# Patient Record
Sex: FEMALE | Race: WHITE | NOT HISPANIC OR LATINO | Employment: OTHER | URBAN - METROPOLITAN AREA
[De-identification: names, ages, dates, MRNs, and addresses within clinical notes are randomized per-mention and may not be internally consistent; named-entity substitution may affect disease eponyms.]

---

## 2023-08-21 ENCOUNTER — HOSPITAL ENCOUNTER (OUTPATIENT)
Dept: DATA CONVERSION | Facility: HOSPITAL | Age: 68
Discharge: HOME | End: 2023-08-21
Payer: MEDICARE

## 2023-08-21 DIAGNOSIS — Z00.00 ENCOUNTER FOR GENERAL ADULT MEDICAL EXAMINATION WITHOUT ABNORMAL FINDINGS: ICD-10-CM

## 2023-08-21 DIAGNOSIS — R82.90 UNSPECIFIED ABNORMAL FINDINGS IN URINE: ICD-10-CM

## 2023-08-21 DIAGNOSIS — E78.49 OTHER HYPERLIPIDEMIA: ICD-10-CM

## 2023-08-21 LAB
ALBUMIN SERPL-MCNC: 3.9 GM/DL (ref 3.5–5)
ALBUMIN/GLOB SERPL: 1.3 RATIO (ref 1.5–3)
ALP BLD-CCNC: 132 U/L (ref 35–125)
ALT SERPL-CCNC: 19 U/L (ref 5–40)
ANION GAP SERPL CALCULATED.3IONS-SCNC: 12 MMOL/L (ref 0–19)
APPEARANCE PLAS: ABNORMAL
AST SERPL-CCNC: 19 U/L (ref 5–40)
BACTERIA SPEC CULT: NORMAL
BILIRUB SERPL-MCNC: 0.7 MG/DL (ref 0.1–1.2)
BILIRUB UR QL STRIP.AUTO: NEGATIVE
BUN SERPL-MCNC: 17 MG/DL (ref 8–25)
BUN/CREAT SERPL: 17 RATIO (ref 8–21)
CALCIUM SERPL-MCNC: 9.4 MG/DL (ref 8.5–10.4)
CC # UR: NORMAL /UL
CHLORIDE SERPL-SCNC: 104 MMOL/L (ref 97–107)
CHOLEST SERPL-MCNC: 176 MG/DL (ref 133–200)
CHOLEST/HDLC SERPL: 4.2 RATIO
CLARITY UR: CLEAR
CO2 SERPL-SCNC: 24 MMOL/L (ref 24–31)
COLOR SPUN FLD: ABNORMAL
COLOR UR: YELLOW
CREAT SERPL-MCNC: 1 MG/DL (ref 0.4–1.6)
DEPRECATED RDW RBC AUTO: 45.9 FL (ref 37–54)
ERYTHROCYTE [DISTWIDTH] IN BLOOD BY AUTOMATED COUNT: 13.4 % (ref 11.7–15)
FASTING STATUS PATIENT QL REPORTED: ABNORMAL
GFR SERPL CREATININE-BSD FRML MDRD: 62 ML/MIN/1.73 M2
GLOBULIN SER-MCNC: 3 G/DL (ref 1.9–3.7)
GLUCOSE SERPL-MCNC: 94 MG/DL (ref 65–99)
GLUCOSE UR STRIP.AUTO-MCNC: NEGATIVE MG/DL
HCT VFR BLD AUTO: 43 % (ref 36–44)
HDLC SERPL-MCNC: 42 MG/DL
HGB BLD-MCNC: 13.7 GM/DL (ref 12–15)
HGB UR QL STRIP.AUTO: ABNORMAL /HPF (ref 0–3)
HGB UR QL: NEGATIVE
KETONES UR QL STRIP.AUTO: NEGATIVE
LDLC SERPL CALC-MCNC: 108 MG/DL (ref 65–130)
LEUKOCYTE ESTERASE UR QL STRIP.AUTO: ABNORMAL
MCH RBC QN AUTO: 29.5 PG (ref 26–34)
MCHC RBC AUTO-ENTMCNC: 31.9 % (ref 31–37)
MCV RBC AUTO: 92.5 FL (ref 80–100)
MICRO URNS: ABNORMAL
MICROSCOPIC (UA): ABNORMAL
NITRITE UR QL STRIP.AUTO: NEGATIVE
NRBC BLD-RTO: 0 /100 WBC
PH UR STRIP.AUTO: 5.5 [PH] (ref 4.6–8)
PLATELET # BLD AUTO: 277 K/UL (ref 150–450)
PMV BLD AUTO: 10.3 CU (ref 7–12.6)
POTASSIUM SERPL-SCNC: 4.3 MMOL/L (ref 3.4–5.1)
PROT SERPL-MCNC: 6.9 G/DL (ref 5.9–7.9)
PROT UR STRIP.AUTO-MCNC: NEGATIVE MG/DL
RBC # BLD AUTO: 4.65 M/UL (ref 4–4.9)
REPORT STATUS -LH SQ DATA CONVERSION: NORMAL
SERVICE CMNT-IMP: NORMAL
SODIUM SERPL-SCNC: 140 MMOL/L (ref 133–145)
SP GR UR STRIP.AUTO: 1.02 (ref 1–1.03)
SPECIMEN SOURCE: NORMAL
TRIGL SERPL-MCNC: 128 MG/DL (ref 40–150)
UNSPECIFIED CRY UR QL COMP ASSIST: ABNORMAL
URINE CULTURE: ABNORMAL
UROBILINOGEN UR QL STRIP.AUTO: NORMAL MG/DL (ref 0–1)
WBC # BLD AUTO: 7.3 K/UL (ref 4.5–11)
WBC #/AREA URNS AUTO: ABNORMAL /HPF (ref 0–3)

## 2023-08-23 ENCOUNTER — HOSPITAL ENCOUNTER (OUTPATIENT)
Dept: DATA CONVERSION | Facility: HOSPITAL | Age: 68
Discharge: HOME | End: 2023-08-23
Payer: MEDICARE

## 2023-08-23 DIAGNOSIS — Z12.31 ENCOUNTER FOR SCREENING MAMMOGRAM FOR MALIGNANT NEOPLASM OF BREAST: ICD-10-CM

## 2023-08-30 ENCOUNTER — HOSPITAL ENCOUNTER (OUTPATIENT)
Dept: DATA CONVERSION | Facility: HOSPITAL | Age: 68
Discharge: HOME | End: 2023-08-30
Payer: MEDICARE

## 2023-08-30 DIAGNOSIS — R74.8 ABNORMAL LEVELS OF OTHER SERUM ENZYMES: ICD-10-CM

## 2023-09-04 LAB
ALP BONE SERPL-CCNC: ABNORMAL U/L
ALP ISOS SERPL HS-CCNC: ABNORMAL U/L
ALP LIVER SERPL-CCNC: ABNORMAL U/L
ALP SERPL-CCNC: ABNORMAL U/L

## 2023-09-05 PROBLEM — I20.9 ANGINA PECTORIS (CMS-HCC): Status: ACTIVE | Noted: 2023-09-05

## 2023-09-05 PROBLEM — M70.62 TROCHANTERIC BURSITIS, LEFT HIP: Status: ACTIVE | Noted: 2023-09-05

## 2023-09-05 PROBLEM — F32.A DEPRESSION: Status: ACTIVE | Noted: 2023-09-05

## 2023-09-05 PROBLEM — M54.16 LUMBAR RADICULAR PAIN: Status: ACTIVE | Noted: 2023-09-05

## 2023-09-05 PROBLEM — F43.10 PTSD (POST-TRAUMATIC STRESS DISORDER): Status: ACTIVE | Noted: 2023-09-05

## 2023-09-05 PROBLEM — J44.9 CHRONIC OBSTRUCTIVE PULMONARY DISEASE (MULTI): Status: ACTIVE | Noted: 2023-09-05

## 2023-09-05 PROBLEM — M54.12 CERVICAL RADICULOPATHY: Status: ACTIVE | Noted: 2023-09-05

## 2023-09-05 PROBLEM — R93.1 ABNORMAL FINDINGS ON DIAGNOSTIC IMAGING OF HEART AND CORONARY CIRCULATION: Status: ACTIVE | Noted: 2023-09-05

## 2023-09-05 PROBLEM — E78.2 MIXED HYPERLIPIDEMIA: Status: ACTIVE | Noted: 2023-09-05

## 2023-09-05 PROBLEM — M35.3 POLYMYALGIA (MULTI): Status: ACTIVE | Noted: 2023-09-05

## 2023-09-05 PROBLEM — M70.61 TROCHANTERIC BURSITIS, RIGHT HIP: Status: ACTIVE | Noted: 2023-09-05

## 2023-09-05 PROBLEM — M75.101 ROTATOR CUFF SYNDROME OF RIGHT SHOULDER: Status: ACTIVE | Noted: 2023-09-05

## 2023-09-05 PROBLEM — J43.2 CENTRILOBULAR EMPHYSEMA (MULTI): Status: ACTIVE | Noted: 2023-09-05

## 2023-09-05 PROBLEM — I77.819 DILATATION OF AORTA (CMS-HCC): Status: ACTIVE | Noted: 2023-09-05

## 2023-09-05 PROBLEM — G89.4 CHRONIC PAIN SYNDROME: Status: ACTIVE | Noted: 2023-09-05

## 2023-09-05 PROBLEM — F43.9 STRESS: Status: ACTIVE | Noted: 2023-09-05

## 2023-09-05 PROBLEM — M47.814 THORACIC SPONDYLOSIS: Status: ACTIVE | Noted: 2023-09-05

## 2023-09-05 PROBLEM — M79.7 FIBROMYALGIA: Status: ACTIVE | Noted: 2023-09-05

## 2023-09-05 PROBLEM — M25.559 PAIN IN JOINT INVOLVING PELVIC REGION AND THIGH: Status: ACTIVE | Noted: 2023-09-05

## 2023-09-05 PROBLEM — I71.21 ASCENDING AORTIC ANEURYSM (CMS-HCC): Status: ACTIVE | Noted: 2023-09-05

## 2023-09-05 PROBLEM — G45.3 AMAUROSIS FUGAX OF RIGHT EYE: Status: ACTIVE | Noted: 2023-09-05

## 2023-09-05 PROBLEM — E55.9 VITAMIN D DEFICIENCY: Status: ACTIVE | Noted: 2023-09-05

## 2023-09-05 PROBLEM — R06.02 SHORTNESS OF BREATH: Status: ACTIVE | Noted: 2023-09-05

## 2023-09-05 PROBLEM — I71.20 THORACIC AORTIC ANEURYSM WITHOUT RUPTURE (CMS-HCC): Status: ACTIVE | Noted: 2023-09-05

## 2023-09-05 PROBLEM — I71.9 DESCENDING AORTIC ANEURYSM (CMS-HCC): Status: ACTIVE | Noted: 2023-09-05

## 2023-09-05 PROBLEM — I77.810 DILATED AORTIC ROOT (CMS-HCC): Status: ACTIVE | Noted: 2023-09-05

## 2023-09-05 PROBLEM — G89.29 OTHER CHRONIC PAIN: Status: ACTIVE | Noted: 2023-09-05

## 2023-09-05 PROBLEM — E83.52 HYPERCALCEMIA: Status: ACTIVE | Noted: 2023-09-05

## 2023-09-05 PROBLEM — F17.200 TOBACCO USE DISORDER: Status: ACTIVE | Noted: 2023-09-05

## 2023-09-05 RX ORDER — ASPIRIN 81 MG/1
1 TABLET ORAL DAILY
COMMUNITY

## 2023-09-05 RX ORDER — BUPROPION HYDROCHLORIDE 150 MG/1
1 TABLET, FILM COATED, EXTENDED RELEASE ORAL DAILY
COMMUNITY
Start: 2022-02-16 | End: 2023-10-23 | Stop reason: ALTCHOICE

## 2023-09-05 RX ORDER — PRAVASTATIN SODIUM 80 MG/1
1 TABLET ORAL DAILY
COMMUNITY

## 2023-09-05 RX ORDER — VARENICLINE TARTRATE 0.5 MG/1
1 TABLET, FILM COATED ORAL DAILY
COMMUNITY
Start: 2022-02-21 | End: 2023-10-23 | Stop reason: ALTCHOICE

## 2023-09-05 RX ORDER — ACETAMINOPHEN 500 MG
1 TABLET ORAL DAILY
COMMUNITY
End: 2023-10-23 | Stop reason: ALTCHOICE

## 2023-09-05 RX ORDER — OXYCODONE AND ACETAMINOPHEN 5; 325 MG/1; MG/1
1 TABLET ORAL EVERY 6 HOURS PRN
COMMUNITY
Start: 2022-12-27 | End: 2023-10-23 | Stop reason: ALTCHOICE

## 2023-09-05 RX ORDER — NADOLOL 20 MG/1
1 TABLET ORAL DAILY
COMMUNITY

## 2023-09-05 RX ORDER — EPINEPHRINE 0.22MG
1 AEROSOL WITH ADAPTER (ML) INHALATION DAILY
COMMUNITY
End: 2023-10-23 | Stop reason: ALTCHOICE

## 2023-09-05 RX ORDER — BUPROPION HYDROCHLORIDE 75 MG/1
1 TABLET ORAL 2 TIMES DAILY
COMMUNITY
End: 2023-10-23 | Stop reason: ALTCHOICE

## 2023-10-02 ENCOUNTER — LAB (OUTPATIENT)
Dept: LAB | Facility: LAB | Age: 68
End: 2023-10-02
Payer: MEDICARE

## 2023-10-02 DIAGNOSIS — I71.20 THORACIC AORTIC ANEURYSM, WITHOUT RUPTURE, UNSPECIFIED (CMS-HCC): Primary | ICD-10-CM

## 2023-10-02 LAB
BUN SERPL-MCNC: 20 MG/DL (ref 8–25)
CREAT SERPL-MCNC: 0.9 MG/DL (ref 0.4–1.6)
GFR SERPL CREATININE-BSD FRML MDRD: 70 ML/MIN/1.73M*2

## 2023-10-02 PROCEDURE — 36415 COLL VENOUS BLD VENIPUNCTURE: CPT

## 2023-10-03 ENCOUNTER — ANCILLARY PROCEDURE (OUTPATIENT)
Dept: RADIOLOGY | Facility: CLINIC | Age: 68
End: 2023-10-03
Payer: MEDICARE

## 2023-10-03 DIAGNOSIS — I71.20 THORACIC AORTIC ANEURYSM, WITHOUT RUPTURE, UNSPECIFIED (CMS-HCC): ICD-10-CM

## 2023-10-03 PROCEDURE — 74174 CTA ABD&PLVS W/CONTRAST: CPT | Performed by: RADIOLOGY

## 2023-10-03 PROCEDURE — 2550000001 HC RX 255 CONTRASTS: Performed by: THORACIC SURGERY (CARDIOTHORACIC VASCULAR SURGERY)

## 2023-10-03 PROCEDURE — 71275 CT ANGIOGRAPHY CHEST: CPT

## 2023-10-03 PROCEDURE — 71275 CT ANGIOGRAPHY CHEST: CPT | Performed by: RADIOLOGY

## 2023-10-03 RX ADMIN — IOHEXOL 100 ML: 350 INJECTION, SOLUTION INTRAVENOUS at 14:11

## 2023-10-05 ENCOUNTER — TELEPHONE (OUTPATIENT)
Dept: PRIMARY CARE | Facility: CLINIC | Age: 68
End: 2023-10-05
Payer: MEDICARE

## 2023-10-05 NOTE — TELEPHONE ENCOUNTER
Patient left message that she is scheduled for a CT w/o contrast. She stated she just had a CT with contrast on Tuesday. Requesting a call back to discuss.

## 2023-10-06 ENCOUNTER — HOSPITAL ENCOUNTER (OUTPATIENT)
Dept: RADIOLOGY | Facility: HOSPITAL | Age: 68
Discharge: HOME | End: 2023-10-06
Payer: MEDICARE

## 2023-10-06 DIAGNOSIS — Z13.820 ENCOUNTER FOR SCREENING FOR OSTEOPOROSIS: ICD-10-CM

## 2023-10-06 PROCEDURE — 77080 DXA BONE DENSITY AXIAL: CPT

## 2023-10-11 ENCOUNTER — APPOINTMENT (OUTPATIENT)
Dept: CARDIAC SURGERY | Facility: HOSPITAL | Age: 68
End: 2023-10-11
Payer: MEDICARE

## 2023-10-11 NOTE — TELEPHONE ENCOUNTER
Please set her up for a DEDICATED follow up on all these questions and she and I can review her tests in real time and review the reasons for the test(s) to be sure no further triage is needed.  Please also try to obtain the actual CT reports so I may review if it included the entire CHEST/LUNGS or only the lower segments for her visit.

## 2023-10-11 NOTE — TELEPHONE ENCOUNTER
Pt had ct angio chest abdomen already completed. CT she is scheduled for was ordered by PCP as low dose lung screening. Per rad report, lungs are noted on . Please advise if pt still needs to complete the low dose lung CT?

## 2023-10-22 NOTE — PROGRESS NOTES
"This is a 68 year old female for FU visit for review of CT ANGIO CHEST/ABDO/PELVIS with OVARIAN issue and stable aortic findings as well as DEXA BONE DENSITY STUDY osteopenia.  She is in care and MONITORING by CARDIOTHORACIC main [facility] for AORTIC aneursyms ascending and descending.    HAS HAD MILD SINUS HA x a few weeks requests Rx for this.      Has MILD OSTEOPENIA and taking VIT D and will add CALTRATE D    Subjective   Zach Man is a 68 y.o. female who presents for FOLLOW UP of RESULTS:      CT ANGIO CHEST/ABDO and PELVIS:      IMPRESSION:  1. Similar appearance of ectasia of the ascending thoracic aorta  measuring up to 3.9 cm, and descending thoracic aorta measuring up to  3.8 cm and suprarenal abdominal aorta also measuring up to 3.8 cm,  overall similar as compared to CT from 10/13/2021.  2. Fusiform ectasia of the infrarenal abdominal aorta measuring up to  2.6 cm in diameter is also similar to prior.  3. Likely right adnexal cyst measuring up to 1.5 cm as compared to  2.3 cm previously. Pelvic ultrasound can be obtained for further  evaluation if clinically indicated.  4. Additional stable findings as above.      Signed by: Mich Bains 10/3/2023 5:24 PM  DEXA BONE DENSITY:    IMPRESSION:  DEXA:  According to World Health Organization criteria,  classification is low bone mass (osteopenia).      Followup recommended in two years or sooner as clinically warranted.      VFA:  NO VERTEBRAL FRACTURES WERE SEEN.      All images and detailed analysis are available on the  Radiology  PACS.      MACRO:  None      Signed by: Jose Smith 10/6/2023 11:57 AM  Dictation workstation:   [id number]      HPI:        Review of systems is essentially negative for all systems except for any identified issues in HPI above.    Objective     /70   Pulse 67   Temp 36.8 °C (98.3 °F)   Ht 1.702 m (5' 7\")   Wt 73.9 kg (163 lb)   SpO2 97%   BMI 25.53 kg/m²      Physical Examination:       GENERAL           General " Appearance: well-appearing, well-developed, well-hydrated, well-nourished, no acute distress.        HEENT           NECK supple, no masses or thyromegaly, no carotid bruit.        EYES           Extraocular Movements: normal, bilateral eyes BIRGIT, no conjunctival injection.        HEART           Rate and Rhythm regular rate and rhythm. Heart sounds: normal S1S2, no S3 or S4. Murmurs: none.        CHEST           Breath sounds: Clear to IPPA, RR<16 no use of accessory muscles.        ABDOMEN           General: Neg for LKKS or masses, no scleral icterus or jaundice.        MUSCULOSKELETAL           Joints Demonstration: Neg for erythema, swelling or joint deformities. gross abnormalities no gross abnormalities.        EXTREMITIES           Lower Extremities: Neg for cyanosis, clubbing or edema.       Assessment/Plan   Problem List Items Addressed This Visit    None  Visit Diagnoses       Osteopenia, unspecified location    -  Primary    Cyst of right ovary        Relevant Orders    US pelvis transvaginal            FOLLOW UP:  PRN and as specified above         Ana Buchanan M.D. ROS is NEG for NAUSEA, VOMITING, DIARRHEA, CHEST PAIN, SOB, and BLEEDING and as further REVIEWED BELOW.

## 2023-10-23 ENCOUNTER — OFFICE VISIT (OUTPATIENT)
Dept: PRIMARY CARE | Facility: CLINIC | Age: 68
End: 2023-10-23
Payer: MEDICARE

## 2023-10-23 VITALS
SYSTOLIC BLOOD PRESSURE: 126 MMHG | HEIGHT: 67 IN | WEIGHT: 163 LBS | DIASTOLIC BLOOD PRESSURE: 70 MMHG | OXYGEN SATURATION: 97 % | BODY MASS INDEX: 25.58 KG/M2 | TEMPERATURE: 98.3 F | HEART RATE: 67 BPM

## 2023-10-23 DIAGNOSIS — N83.201 CYST OF RIGHT OVARY: ICD-10-CM

## 2023-10-23 DIAGNOSIS — J01.90 ACUTE SINUSITIS, RECURRENCE NOT SPECIFIED, UNSPECIFIED LOCATION: ICD-10-CM

## 2023-10-23 DIAGNOSIS — M85.80 OSTEOPENIA, UNSPECIFIED LOCATION: Primary | ICD-10-CM

## 2023-10-23 PROCEDURE — 1125F AMNT PAIN NOTED PAIN PRSNT: CPT | Performed by: FAMILY MEDICINE

## 2023-10-23 PROCEDURE — 4004F PT TOBACCO SCREEN RCVD TLK: CPT | Performed by: FAMILY MEDICINE

## 2023-10-23 PROCEDURE — 99214 OFFICE O/P EST MOD 30 MIN: CPT | Performed by: FAMILY MEDICINE

## 2023-10-23 RX ORDER — LANOLIN ALCOHOL/MO/W.PET/CERES
100 CREAM (GRAM) TOPICAL DAILY
COMMUNITY

## 2023-10-23 RX ORDER — ASCORBIC ACID 500 MG
500 TABLET ORAL DAILY
COMMUNITY

## 2023-10-23 RX ORDER — BUTYROSPERMUM PARKII(SHEA BUTTER), SIMMONDSIA CHINENSIS (JOJOBA) SEED OIL, ALOE BARBADENSIS LEAF EXTRACT .01; 1; 3.5 G/100G; G/100G; G/100G
100 LIQUID TOPICAL
COMMUNITY
End: 2024-05-20 | Stop reason: WASHOUT

## 2023-10-23 RX ORDER — AMOXICILLIN 875 MG/1
875 TABLET, FILM COATED ORAL 2 TIMES DAILY
Qty: 20 TABLET | Refills: 0 | Status: SHIPPED | OUTPATIENT
Start: 2023-10-23 | End: 2023-11-02

## 2023-10-23 RX ORDER — CHOLECALCIFEROL (VITAMIN D3) 50 MCG
50 TABLET ORAL DAILY
COMMUNITY

## 2023-10-23 ASSESSMENT — PATIENT HEALTH QUESTIONNAIRE - PHQ9
2. FEELING DOWN, DEPRESSED OR HOPELESS: NOT AT ALL
SUM OF ALL RESPONSES TO PHQ9 QUESTIONS 1 AND 2: 0
1. LITTLE INTEREST OR PLEASURE IN DOING THINGS: NOT AT ALL

## 2023-10-23 ASSESSMENT — ENCOUNTER SYMPTOMS
DEPRESSION: 0
OCCASIONAL FEELINGS OF UNSTEADINESS: 0
LOSS OF SENSATION IN FEET: 0

## 2023-10-23 ASSESSMENT — PAIN SCALES - GENERAL: PAINLEVEL: 10-WORST PAIN EVER

## 2023-10-23 NOTE — PATIENT INSTRUCTIONS
If your typical SINUS HEADACHE continues after our attempt at treatment we have agreed to have you FOLLOW UP here and I will order a CT SCAN of your brain to rule out more worrisome issues.

## 2023-10-25 ENCOUNTER — APPOINTMENT (OUTPATIENT)
Dept: CARDIAC SURGERY | Facility: HOSPITAL | Age: 68
End: 2023-10-25
Payer: MEDICARE

## 2023-11-03 ENCOUNTER — TELEPHONE (OUTPATIENT)
Dept: PRIMARY CARE | Facility: CLINIC | Age: 68
End: 2023-11-03
Payer: MEDICARE

## 2023-11-03 DIAGNOSIS — N94.9 GYNECOLOGICAL COMPLAINT: Primary | ICD-10-CM

## 2023-11-03 NOTE — TELEPHONE ENCOUNTER
Patient left message stating her transvaginal US is scheduled. Also scheduled to see OB on 12/14 with Dr. Aguayo. Their office is requesting a referral be sent to them.

## 2023-11-06 ENCOUNTER — OFFICE VISIT (OUTPATIENT)
Dept: CARDIAC SURGERY | Facility: HOSPITAL | Age: 68
End: 2023-11-06
Payer: MEDICARE

## 2023-11-06 ENCOUNTER — APPOINTMENT (OUTPATIENT)
Dept: CARDIAC SURGERY | Facility: HOSPITAL | Age: 68
End: 2023-11-06
Payer: MEDICARE

## 2023-11-06 VITALS
DIASTOLIC BLOOD PRESSURE: 82 MMHG | BODY MASS INDEX: 25.37 KG/M2 | WEIGHT: 162 LBS | OXYGEN SATURATION: 100 % | SYSTOLIC BLOOD PRESSURE: 168 MMHG | HEART RATE: 67 BPM

## 2023-11-06 DIAGNOSIS — I71.21 ANEURYSM OF ASCENDING AORTA WITHOUT RUPTURE (CMS-HCC): Primary | ICD-10-CM

## 2023-11-06 PROCEDURE — 99214 OFFICE O/P EST MOD 30 MIN: CPT

## 2023-11-06 PROCEDURE — 1159F MED LIST DOCD IN RCRD: CPT

## 2023-11-06 PROCEDURE — 1126F AMNT PAIN NOTED NONE PRSNT: CPT

## 2023-11-06 PROCEDURE — 4004F PT TOBACCO SCREEN RCVD TLK: CPT

## 2023-11-06 PROCEDURE — 1160F RVW MEDS BY RX/DR IN RCRD: CPT

## 2023-11-06 ASSESSMENT — PAIN SCALES - GENERAL: PAINLEVEL: 0-NO PAIN

## 2023-11-06 ASSESSMENT — ENCOUNTER SYMPTOMS
COUGH: 0
FEVER: 0
ORTHOPNEA: 0
SHORTNESS OF BREATH: 0
CHILLS: 0

## 2023-11-06 NOTE — PROGRESS NOTES
Subjective   Zach Man is a 68 y.o. female.    Chief Complaint:  Aortic clinic follow up       HPI  This is a 68 years old female patient who is here for a follow-up visit with aortic center due to a dilated ascending aorta. She states that she is doing well overall and does not have any complaints today. Here in office BP is high today 166/82 as she was late and rushing to appointment. She denies experiencing dyspnea on exertion, palpitations, LE edema, and syncopal episodes. She had a recent CT scan that will be reviewed.                         Review of Systems   Constitutional: Negative for chills and fever.   Cardiovascular:  Negative for chest pain, leg swelling and orthopnea.   Respiratory:  Negative for cough and shortness of breath.          Assessment/Plan     In summary this is a 68 year old female patient who is here to continue care with aortic center and follow-up with a dilated ascending aorta. The new CT scan shows no significant changes so we will not going to do any further management more than observation for another year with repeat C CT scan as long as the aorta remains stable most recent CT done 10/03/2023 shows a 3.9 cm aorta. We will continue to follow her annually. She is currently having her other findings on the CT addressed with her transvaginal ultrasound scheduled for tomorrow.     Plan:  Re-educated the importance of strict BP control   Call with any questions, issues, and or concerns.  Follow up with primary for BP control if needed.  Repeat CT in 1 year.

## 2023-11-07 ENCOUNTER — ANCILLARY PROCEDURE (OUTPATIENT)
Dept: RADIOLOGY | Facility: CLINIC | Age: 68
End: 2023-11-07
Payer: MEDICARE

## 2023-11-07 DIAGNOSIS — N83.201 CYST OF RIGHT OVARY: ICD-10-CM

## 2023-11-07 PROCEDURE — 76830 TRANSVAGINAL US NON-OB: CPT

## 2023-12-14 PROCEDURE — 88305 TISSUE EXAM BY PATHOLOGIST: CPT

## 2023-12-14 PROCEDURE — 87624 HPV HI-RISK TYP POOLED RSLT: CPT

## 2023-12-14 PROCEDURE — 88175 CYTOPATH C/V AUTO FLUID REDO: CPT

## 2023-12-15 ENCOUNTER — LAB REQUISITION (OUTPATIENT)
Dept: LAB | Facility: HOSPITAL | Age: 68
End: 2023-12-15
Payer: MEDICARE

## 2023-12-15 DIAGNOSIS — Z11.51 ENCOUNTER FOR SCREENING FOR HUMAN PAPILLOMAVIRUS (HPV): ICD-10-CM

## 2023-12-15 DIAGNOSIS — N85.9 NONINFLAMMATORY DISORDER OF UTERUS, UNSPECIFIED: ICD-10-CM

## 2023-12-15 DIAGNOSIS — Z01.419 ENCOUNTER FOR GYNECOLOGICAL EXAMINATION (GENERAL) (ROUTINE) WITHOUT ABNORMAL FINDINGS: ICD-10-CM

## 2023-12-18 LAB
LABORATORY COMMENT REPORT: NORMAL
PATH REPORT.FINAL DX SPEC: NORMAL
PATH REPORT.GROSS SPEC: NORMAL
PATH REPORT.RELEVANT HX SPEC: NORMAL
PATH REPORT.TOTAL CANCER: NORMAL

## 2024-05-14 PROBLEM — I10 HYPERTENSION: Status: ACTIVE | Noted: 2024-05-14

## 2024-05-14 PROBLEM — F32.2 CURRENT SEVERE EPISODE OF MAJOR DEPRESSIVE DISORDER WITHOUT PSYCHOTIC FEATURES (MULTI): Chronic | Status: ACTIVE | Noted: 2023-08-21

## 2024-05-14 RX ORDER — CETIRIZINE HYDROCHLORIDE 10 MG/1
10 TABLET ORAL DAILY
COMMUNITY
Start: 2023-09-29 | End: 2024-05-20 | Stop reason: WASHOUT

## 2024-05-14 RX ORDER — CLOBETASOL PROPIONATE 0.5 MG/G
CREAM TOPICAL
COMMUNITY
Start: 2023-09-29 | End: 2024-05-20 | Stop reason: WASHOUT

## 2024-05-14 RX ORDER — PERMETHRIN 50 MG/G
CREAM TOPICAL
COMMUNITY
Start: 2023-09-29 | End: 2024-05-20 | Stop reason: WASHOUT

## 2024-05-14 RX ORDER — MUPIROCIN 20 MG/G
OINTMENT TOPICAL
COMMUNITY
Start: 2023-09-29 | End: 2024-05-20 | Stop reason: WASHOUT

## 2024-05-16 PROBLEM — G45.3 AMAUROSIS FUGAX OF RIGHT EYE: Status: RESOLVED | Noted: 2023-09-05 | Resolved: 2024-05-16

## 2024-05-16 PROBLEM — I20.9 ANGINA PECTORIS (CMS-HCC): Status: RESOLVED | Noted: 2023-09-05 | Resolved: 2024-05-16

## 2024-05-20 ENCOUNTER — OFFICE VISIT (OUTPATIENT)
Dept: CARDIOLOGY | Facility: CLINIC | Age: 69
End: 2024-05-20
Payer: MEDICARE

## 2024-05-20 VITALS
RESPIRATION RATE: 18 BRPM | BODY MASS INDEX: 25.11 KG/M2 | SYSTOLIC BLOOD PRESSURE: 128 MMHG | HEIGHT: 67 IN | TEMPERATURE: 98.6 F | HEART RATE: 88 BPM | WEIGHT: 160 LBS | OXYGEN SATURATION: 98 % | DIASTOLIC BLOOD PRESSURE: 80 MMHG

## 2024-05-20 DIAGNOSIS — J43.2 CENTRILOBULAR EMPHYSEMA (MULTI): ICD-10-CM

## 2024-05-20 DIAGNOSIS — I71.9 DESCENDING AORTIC ANEURYSM (CMS-HCC): ICD-10-CM

## 2024-05-20 DIAGNOSIS — F17.200 TOBACCO USE DISORDER: Primary | ICD-10-CM

## 2024-05-20 DIAGNOSIS — I77.819 DILATATION OF AORTA (CMS-HCC): ICD-10-CM

## 2024-05-20 DIAGNOSIS — I10 PRIMARY HYPERTENSION: ICD-10-CM

## 2024-05-20 DIAGNOSIS — E78.2 MIXED HYPERLIPIDEMIA: ICD-10-CM

## 2024-05-20 DIAGNOSIS — R06.02 SHORTNESS OF BREATH: ICD-10-CM

## 2024-05-20 PROCEDURE — 3079F DIAST BP 80-89 MM HG: CPT | Performed by: INTERNAL MEDICINE

## 2024-05-20 PROCEDURE — 1126F AMNT PAIN NOTED NONE PRSNT: CPT | Performed by: INTERNAL MEDICINE

## 2024-05-20 PROCEDURE — 1159F MED LIST DOCD IN RCRD: CPT | Performed by: INTERNAL MEDICINE

## 2024-05-20 PROCEDURE — 99407 BEHAV CHNG SMOKING > 10 MIN: CPT | Performed by: INTERNAL MEDICINE

## 2024-05-20 PROCEDURE — 3074F SYST BP LT 130 MM HG: CPT | Performed by: INTERNAL MEDICINE

## 2024-05-20 PROCEDURE — 99213 OFFICE O/P EST LOW 20 MIN: CPT | Performed by: INTERNAL MEDICINE

## 2024-05-20 ASSESSMENT — PATIENT HEALTH QUESTIONNAIRE - PHQ9
1. LITTLE INTEREST OR PLEASURE IN DOING THINGS: SEVERAL DAYS
2. FEELING DOWN, DEPRESSED OR HOPELESS: SEVERAL DAYS
SUM OF ALL RESPONSES TO PHQ9 QUESTIONS 1 AND 2: 2
10. IF YOU CHECKED OFF ANY PROBLEMS, HOW DIFFICULT HAVE THESE PROBLEMS MADE IT FOR YOU TO DO YOUR WORK, TAKE CARE OF THINGS AT HOME, OR GET ALONG WITH OTHER PEOPLE: SOMEWHAT DIFFICULT

## 2024-05-20 ASSESSMENT — PAIN SCALES - GENERAL: PAINLEVEL: 0-NO PAIN

## 2024-05-20 ASSESSMENT — LIFESTYLE VARIABLES
SKIP TO QUESTIONS 9-10: 1
HOW OFTEN DO YOU HAVE SIX OR MORE DRINKS ON ONE OCCASION: NEVER
HOW OFTEN DO YOU HAVE A DRINK CONTAINING ALCOHOL: NEVER
AUDIT-C TOTAL SCORE: 0
HOW MANY STANDARD DRINKS CONTAINING ALCOHOL DO YOU HAVE ON A TYPICAL DAY: PATIENT DOES NOT DRINK

## 2024-05-20 ASSESSMENT — ENCOUNTER SYMPTOMS
LOSS OF SENSATION IN FEET: 0
DEPRESSION: 0
OCCASIONAL FEELINGS OF UNSTEADINESS: 1

## 2024-05-20 NOTE — PROGRESS NOTES
Subjective   Chief Complaint   Patient presents with    Follow-up     Zach Man presents to the office today for a 6 month follow up.         68-year-old female patient with a history of hypertension hyperlipidemia history of fibromyalgia and arthritis.  Aneurysm in the past.  Negative nuclear stress test for ischemia about 4 years ago.  Currently on nadolol 20 mg tablet once a day with aspirin and pravastatin.  Patient also had negative stress test with ischemia about 4 years ago.  Patient had imaging study done CT angiogram in the September last year essentially essentially showed no ascending aortic aneurysm up to 3.9, descending thoracic aortic aneurysm about 3.8 suprarenal abdominal aortic aneurysm with 3.8 similar since October 2021.  Patient has no history of smoking.    Past Medical History:   Diagnosis Date    Aneurysm (CMS-HCC)     Centrilobular emphysema (Multi) 09/05/2023    Chronic obstructive pulmonary disease (Multi) 09/05/2023    Dilatation of aorta (CMS-HCC) 09/05/2023    Dilated aortic root (CMS-HCC) 09/05/2023    Hypertension 05/14/2024    Mixed hyperlipidemia 09/05/2023    Shortness of breath 09/05/2023    Thoracic aortic aneurysm without rupture (CMS-HCC) 09/05/2023     History reviewed. No pertinent surgical history.  No relevant family history has been documented for this patient.  Current Outpatient Medications   Medication Sig Dispense Refill    ascorbic acid (Vitamin C) 500 mg tablet Take 1 tablet (500 mg) by mouth once daily.      aspirin 81 mg EC tablet Take 1 tablet (81 mg) by mouth once daily. for 30 day(s)      cholecalciferol (Vitamin D-3) 50 MCG (2000 UT) tablet Take 1 tablet (50 mcg) by mouth once daily.      cyanocobalamin (Vitamin B-12) 1,000 mcg tablet Take 100 mcg by mouth once daily. 3000mcg      nadolol (Corgard) 20 mg tablet Take 1 tablet (20 mg) by mouth once daily.      pravastatin (Pravachol) 80 mg tablet Take 1 tablet (80 mg) by mouth once daily.       No current  "facility-administered medications for this visit.      reports that she has been smoking cigarettes. She has a 12.5 pack-year smoking history. She has never used smokeless tobacco. She reports that she does not currently use alcohol. She reports that she does not currently use drugs after having used the following drugs: Marijuana.  Buprenorphine  Tobacco use disorder    Vitals:    05/20/24 1448   BP: 128/80   Pulse: 88   Resp: 18   Temp: 37 °C (98.6 °F)   SpO2: 98%   Weight: 72.6 kg (160 lb)   Height: 1.702 m (5' 7\")   PainSc: 0-No pain      BMI:Body mass index is 25.06 kg/m².   General Cardiology:  General Appearance: Alert, oriented and in no acute distress.  HEENT: extra ocular movements intact (EOMI), pupils equal,  round, reactive to light and accommodation (PERRLA).  Carotid Upstroke: no bruit, normal.  Jugular Venous Distention (JVD): flat.  Chest: normal.  Lungs: Clear to auscultation,   Heart Sounds: no S3 or S4, normal S1, S2, regular rate.  Murmur, Click, Gallop: no systolic murmur.  Abdomen: no hepatomegaly, no masses felt, soft.  Extremities: no leg edema.  Peripheral pulses: 2 plus bilateral.  NEUROLOGY Cranial nerves II-XII grossly intact.     Patient Active Problem List   Diagnosis    Abnormal findings on diagnostic imaging of heart and coronary circulation    Centrilobular emphysema (Multi)    Chronic obstructive pulmonary disease (Multi)    Depression    Descending aortic aneurysm (CMS-HCC)    Dilatation of aorta (CMS-HCC)    Hypercalcemia    Cervical radiculopathy    Lumbar radicular pain    Mixed hyperlipidemia    Chronic pain syndrome    Fibromyalgia    Other chronic pain    Pain in joint involving pelvic region and thigh    Polymyalgia (Multi)    PTSD (post-traumatic stress disorder)    Rotator cuff syndrome of right shoulder    Shortness of breath    Stress    Ascending aortic aneurysm (CMS-HCC)    Dilated aortic root (CMS-HCC)    Thoracic aortic aneurysm without rupture (CMS-HCC)    Tobacco " use disorder    Thoracic spondylosis    Trochanteric bursitis, left hip    Trochanteric bursitis, right hip    Vitamin D deficiency    Hypertension    Current severe episode of major depressive disorder without psychotic features (Multi)       Problem List Items Addressed This Visit       Centrilobular emphysema (Multi)    Descending aortic aneurysm (CMS-HCC)    Dilatation of aorta (CMS-HCC)    Mixed hyperlipidemia    Shortness of breath    Tobacco use disorder - Primary    Hypertension      68-year-old female patient with a history of fibromyalgia, borderline hypertension stable cardiac wise.  Underlying hyperlipidemia also.  Stable aneurysm.  Better history of smoking.  Modified risk factors.  Diet and exercise reviewed with patient..advice to walk about 10,000 steps or about 2 hours during day time. Cut back on salt, sugar and flour.  Advised patient to avoid lunch meats, canned soups, pizzas, bread rolls, and sandwiches. Advised patient to limit salt intake 1,500 mg daily. Advised patient to exercise 30 mins/3 times a week including treadmill or aerobic type, Goal to achieve 65% target HR.  Smoking cessation counseling was performed.  The patient was counseled on the harms of smoking, including increased risk for lung disease, cardiovascular disease and cancer. In  the context of the disease, smoking is associated with a greater pain, worse joint damage, and worse response to biological therapy.  About 11 to 15 minute on smoking cessation and counseling to patient and family.    Kd Coronado MD

## 2024-09-11 DIAGNOSIS — R06.02 SHORTNESS OF BREATH: Primary | ICD-10-CM

## 2024-09-11 RX ORDER — NADOLOL 20 MG/1
20 TABLET ORAL DAILY
Qty: 90 TABLET | Refills: 3 | Status: SHIPPED | OUTPATIENT
Start: 2024-09-11

## 2024-09-11 NOTE — TELEPHONE ENCOUNTER
Requested Prescriptions     Pending Prescriptions Disp Refills    nadolol (Corgard) 20 mg tablet 90 tablet 3     Sig: Take 1 tablet (20 mg) by mouth once daily.     90 day supply   3 refills

## 2024-11-10 DIAGNOSIS — I20.9 ANGINA PECTORIS, UNSPECIFIED: ICD-10-CM

## 2024-11-15 RX ORDER — PRAVASTATIN SODIUM 80 MG/1
80 TABLET ORAL DAILY
Qty: 90 TABLET | Refills: 3 | Status: SHIPPED | OUTPATIENT
Start: 2024-11-15 | End: 2025-11-10

## 2025-02-10 ENCOUNTER — APPOINTMENT (OUTPATIENT)
Dept: CARDIOLOGY | Facility: CLINIC | Age: 70
End: 2025-02-10
Payer: MEDICARE